# Patient Record
Sex: MALE | Race: WHITE | NOT HISPANIC OR LATINO | Employment: OTHER | URBAN - METROPOLITAN AREA
[De-identification: names, ages, dates, MRNs, and addresses within clinical notes are randomized per-mention and may not be internally consistent; named-entity substitution may affect disease eponyms.]

---

## 2023-04-16 ENCOUNTER — HOSPITAL ENCOUNTER (EMERGENCY)
Facility: HOSPITAL | Age: 33
Discharge: HOME | End: 2023-04-16
Attending: EMERGENCY MEDICINE
Payer: COMMERCIAL

## 2023-04-16 VITALS
HEART RATE: 65 BPM | OXYGEN SATURATION: 100 % | HEIGHT: 71 IN | BODY MASS INDEX: 23.71 KG/M2 | RESPIRATION RATE: 18 BRPM | DIASTOLIC BLOOD PRESSURE: 92 MMHG | TEMPERATURE: 98 F | SYSTOLIC BLOOD PRESSURE: 150 MMHG

## 2023-04-16 DIAGNOSIS — T14.8XXA FRACTURE: ICD-10-CM

## 2023-04-16 DIAGNOSIS — M25.532 ACUTE PAIN OF LEFT WRIST: ICD-10-CM
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
## 2023-04-16 DIAGNOSIS — M79.671 ACUTE FOOT PAIN, RIGHT: ICD-10-CM

## 2023-04-16 DIAGNOSIS — S52.502A CLOSED FRACTURE OF DISTAL END OF LEFT RADIUS, UNSPECIFIED FRACTURE MORPHOLOGY, INITIAL ENCOUNTER: Primary | ICD-10-CM

## 2023-04-16 DIAGNOSIS — Z01.811 PREOP RESPIRATORY EXAM: ICD-10-CM

## 2023-04-16 PROCEDURE — 96376 TX/PRO/DX INJ SAME DRUG ADON: CPT

## 2023-04-16 PROCEDURE — 99153 MOD SED SAME PHYS/QHP EA: CPT

## 2023-04-16 PROCEDURE — 25605 CLTX DST RDL FX/EPHYS SEP W/: CPT | Mod: LT

## 2023-04-16 PROCEDURE — 96374 THER/PROPH/DIAG INJ IV PUSH: CPT | Mod: 59

## 2023-04-16 PROCEDURE — 63600175 PHARM REV CODE 636 W HCPCS: Performed by: EMERGENCY MEDICINE

## 2023-04-16 PROCEDURE — 99285 EMERGENCY DEPT VISIT HI MDM: CPT | Mod: 25

## 2023-04-16 PROCEDURE — 96375 TX/PRO/DX INJ NEW DRUG ADDON: CPT | Mod: 59

## 2023-04-16 PROCEDURE — 99152 MOD SED SAME PHYS/QHP 5/>YRS: CPT

## 2023-04-16 RX ORDER — OXYCODONE AND ACETAMINOPHEN 7.5; 325 MG/1; MG/1
1 TABLET ORAL EVERY 4 HOURS PRN
Qty: 20 TABLET | Refills: 0 | Status: SHIPPED | OUTPATIENT
Start: 2023-04-16 | End: 2023-04-21

## 2023-04-16 RX ORDER — PROPOFOL 10 MG/ML
INJECTION, EMULSION INTRAVENOUS
Status: COMPLETED
Start: 2023-04-16 | End: 2023-04-16

## 2023-04-16 RX ORDER — PROPOFOL 10 MG/ML
VIAL (ML) INTRAVENOUS CODE/TRAUMA/SEDATION MEDICATION
Status: COMPLETED | OUTPATIENT
Start: 2023-04-16 | End: 2023-04-16

## 2023-04-16 RX ORDER — HYDROMORPHONE HYDROCHLORIDE 2 MG/ML
1 INJECTION, SOLUTION INTRAMUSCULAR; INTRAVENOUS; SUBCUTANEOUS
Status: COMPLETED | OUTPATIENT
Start: 2023-04-16 | End: 2023-04-16

## 2023-04-16 RX ORDER — ONDANSETRON 2 MG/ML
4 INJECTION INTRAMUSCULAR; INTRAVENOUS
Status: COMPLETED | OUTPATIENT
Start: 2023-04-16 | End: 2023-04-16

## 2023-04-16 RX ADMIN — PROPOFOL 20 MG: 10 INJECTION, EMULSION INTRAVENOUS at 12:04

## 2023-04-16 RX ADMIN — PROPOFOL 40 MG: 10 INJECTION, EMULSION INTRAVENOUS at 12:04

## 2023-04-16 RX ADMIN — ONDANSETRON 4 MG: 2 INJECTION INTRAMUSCULAR; INTRAVENOUS at 11:04

## 2023-04-16 RX ADMIN — PROPOFOL 30 MG: 10 INJECTION, EMULSION INTRAVENOUS at 12:04

## 2023-04-16 RX ADMIN — HYDROMORPHONE HYDROCHLORIDE 1 MG: 2 INJECTION, SOLUTION INTRAMUSCULAR; INTRAVENOUS; SUBCUTANEOUS at 11:04

## 2023-04-16 RX ADMIN — PROPOFOL 50 MG: 10 INJECTION, EMULSION INTRAVENOUS at 12:04

## 2023-04-16 RX ADMIN — HYDROMORPHONE HYDROCHLORIDE 1 MG: 2 INJECTION INTRAMUSCULAR; INTRAVENOUS; SUBCUTANEOUS at 12:04

## 2023-04-16 NOTE — ED PROVIDER NOTES
Encounter Date: 4/16/2023    SCRIBE #1 NOTE: I, Betzaida Liborio, am scribing for, and in the presence of,  Aakahs Edmondson MD. I have scribed the following portions of the note - Other sections scribed: HPI, ROS, PE.     History     Chief Complaint   Patient presents with    Fall     Pt fall approx 10ft off ladder; LT wrist deformity & RT foot pain. PMS intact.      A 32 year old male is presenting to the ED following a fall. The pt states that he fell off a ladder earlier today and is reporting left wrist pain and right foot pain. He denies any LOC, hitting his head, chest pain, shortness of breath, or neck pain.     The history is provided by the patient. No  was used.   Review of patient's allergies indicates:  No Known Allergies  No past medical history on file.  No past surgical history on file.  No family history on file.     Review of Systems   Constitutional:  Negative for chills and fever.   Respiratory:  Negative for cough, chest tightness and shortness of breath.    Cardiovascular:  Negative for chest pain.   Gastrointestinal:  Negative for abdominal pain, nausea and vomiting.   Musculoskeletal:  Positive for arthralgias (left wrist pain). Negative for myalgias and neck pain.        Right foot pain     Neurological:  Negative for syncope.   All other systems reviewed and are negative.    Physical Exam     Initial Vitals [04/16/23 1101]   BP Pulse Resp Temp SpO2   118/73 67 18 97.8 °F (36.6 °C) 96 %      MAP       --         Physical Exam    Nursing note and vitals reviewed.  Constitutional: He appears well-developed and well-nourished. No distress.   HENT:   Head: Normocephalic and atraumatic.   Eyes: Conjunctivae are normal.   Cardiovascular:  Normal rate.           Pulses:       Radial pulses are 2+ on the left side.   Pulmonary/Chest: No respiratory distress. He has no wheezes. He has no rhonchi.   Abdominal: Abdomen is soft. There is no abdominal tenderness. There is no rebound and  no guarding.   Musculoskeletal:         General: Normal range of motion.      Left wrist: Deformity present.      Right foot: Tenderness present.      Comments: Z deformity to the left wrist  Decreased extension of fingers to the left hand  Tenderness to the right medial aspect distal to the first MCP of the right foot   Full ROM of right foot  Capillary refill intact to right toes        Neurological: He is alert and oriented to person, place, and time. He has normal strength.   Normal light touch sensation of the left hand    Skin: Skin is warm and dry.   Psychiatric: He has a normal mood and affect.       ED Course   Orthopedic Injury    Date/Time: 4/16/2023 12:19 PM  Performed by: Aakash Edmondson MD  Authorized by: Aakash Edmondson MD     Location procedure was performed:  [facility] EMERGENCY DEPARTMENT  Consent Done?:  Yes  Universal Protocol:     Verbal consent obtained?: Yes      Written consent obtained?: Yes      Consent given by:  Patient    Patient states understanding of procedure being performed: Yes      Patient's understanding of procedure matches consent: Yes      Procedure consent matches procedure scheduled: Yes      Relevant documents present and verified: Yes      Test results available and properly labeled: Yes      Imaging studies available: Yes      Required items: Required blood products, implants, devices and special equipment avialable      Patient identity confirmed:  Name and verbally with patient  Injury:     Injury location:  Wrist    Location details:  Left wrist    Injury type:  Fracture-dislocation      Pre-procedure assessment:     Distal perfusion: normal      Neurological function: diminished      Range of motion: reduced      Local anesthesia used?: No      Patient sedated?: Yes      ASA Class:  Class 1 - Heathy patient. No medical history.    Mallampati Score:  Class 3 - Visualization of the soft palate and base of the uvula.    Date/Time of last fluid:  4/16/2023 9:00 AM     Sedation type: moderate (conscious) sedation      Sedation:  Propofol    Analgesia:  Hydromorphone    Sedation start:  4/16/2023 12:36 PM    Sedation end:  4/16/2023 1:00 PM    Vital signs: Vital signs monitored during sedation        Selections made in this section will also lock the Injury type section above.:     Manipulation performed?: Yes      Reduction successful?: Yes      Confirmation: Reduction confirmed by x-ray      Immobilization:  Splint    Splint type:  Sugar tong    Supplies used:  Ortho-Glass    Complications: No    Post-procedure assessment:     Distal perfusion: normal      Neurological function: normal      Range of motion: improved      Patient tolerance:  Patient tolerated the procedure well with no immediate complications  Labs Reviewed - No data to display       Imaging Results              X-Ray Wrist Complete Left (Final result)  Result time 04/16/23 13:08:40      Final result by Yessenia Eric MD (04/16/23 13:08:40)                   Impression:      Interval reduction of the comminuted angulated fracture of the distal radius when compared to the prior examination    Otherwise unchanged      Electronically signed by: Yessenia Eric  Date:    04/16/2023  Time:    13:08               Narrative:    EXAMINATION:  XR WRIST COMPLETE 3 VIEWS LEFT    CLINICAL HISTORY:  Other injury of unspecified body region, initial encounter    TECHNIQUE:  PA, lateral, and oblique views of the left wrist were performed.    COMPARISON:  04/16/2023    FINDINGS:  There is comminuted fracture of the left distal radius.  The fracture has been reduced.  Good anatomic alignment is noted.  There is also fracture of the ulnar styloid.                                       X-Ray Wrist Complete Left (Final result)  Result time 04/16/23 11:59:26      Final result by Jh Lees MD (04/16/23 11:59:26)                   Impression:      Fractures.      Electronically signed by: Jh  Yoana  Date:    04/16/2023  Time:    11:59               Narrative:    EXAMINATION:  XR WRIST COMPLETE 3 VIEWS LEFT    CLINICAL HISTORY:  Acute pain of the left wrist.    TECHNIQUE:  Three views.    COMPARISON:  None available.    FINDINGS:  There is severely comminuted impacted fracture involving the distal radius with displacement and angulation.  Fracture lines extend and disrupt the articular surface.  There is also fracture displacement of the ulnar styloid process.    On the lateral radiograph, there is also fractured triquetrum.                                       X-Ray Chest AP Portable (Final result)  Result time 04/16/23 11:57:35      Final result by Jh Lees MD (04/16/23 11:57:35)                   Impression:      NO ACUTE CARDIOPULMONARY PROCESS IDENTIFIED.      Electronically signed by: Jh Lees  Date:    04/16/2023  Time:    11:57               Narrative:    EXAMINATION:  XR CHEST AP PORTABLE    CLINICAL HISTORY:  Encounter for preprocedural respiratory examination    TECHNIQUE:  One view    COMPARISON:  None available.    FINDINGS:  Cardiopericardial silhouette is within normal limits. Lungs are without dense focal or segmental consolidation, congestive process, pleural effusions or pneumothorax.                                       Medications   propofoL (DIPRIVAN) 10 mg/mL infusion (40 mg  Incomplete 4/16/23 1243)   HYDROmorphone (PF) injection 1 mg (1 mg Intravenous Given 4/16/23 1125)   ondansetron injection 4 mg (4 mg Intravenous Given 4/16/23 1125)   HYDROmorphone (PF) injection 1 mg (1 mg Intravenous Given 4/16/23 1215)   propofol (DIPRIVAN) 10 mg/mL IVP (20 mg Intravenous Given 4/16/23 1248)     Medical Decision Making:   Clinical Tests:   Lab Tests: Ordered and Reviewed  Radiological Study: Ordered and Reviewed    Medical Decision Making  Differential diagnosis include but are not limited to:  Fall, wrist fracture, foot fracture, radial nerve injury     Problems  Addressed:  Closed fracture of distal end of left radius, unspecified fracture morphology, initial encounter: acute illness or injury that poses a threat to life or bodily functions    Amount and/or Complexity of Data Reviewed  Labs: ordered.  Radiology: ordered.    Risk  Prescription drug management.  Parenteral controlled substances.  Drug therapy requiring intensive monitoring for toxicity.  Decision regarding hospitalization.  Risk Details: Conscious sedation and fracture reduction performed in the emergency department as documented above             Scribe Attestation:   Scribe #1: I performed the above scribed service and the documentation accurately describes the services I performed. I attest to the accuracy of the note.    Attending Attestation:           Physician Attestation for Scribe:  Physician Attestation Statement for Scribe #1: I, Aakash Edmondson MD, reviewed documentation, as scribed by Betzaida Capone in my presence, and it is both accurate and complete.           ED Course as of 04/16/23 1311   Sun Apr 16, 2023   1113 Will set up for conscious sedation using propofol.  I discussed risks and benefits of the medication discussed procedure of reduction of the wrist.  Patient agreed.  Will give pain medication.  I have asked the RN placement finger traps while sitting up [LF]   1114 Denies any has had no head neck chest pelvis pain.  Abdomen is soft nontender lungs clear to auscultation.  Cranial nerves are intact.  He is decreased extension of the left fingers but he is normal light touch sensation radial ulnar and median distribution 2+ radial pulse and brisk capillary refill.  In the right foot he has some tenderness in a very mild contusion on the medial aspect of the distal metacarpal just proximal to the MCP joint of the 1st toe.  Again brisk capillary refill normal motor normal sensation [LF]   1147 I discussed getting x-ray of the foot to evaluate fracture.  X-ray was ordered but apparently the  patient refused it when x-ray when to perform it [LF]   1154 My interpretation of the x-ray of the left wrist there is a comminuted dorsally displaced distal radius fracture.  Tip of the distal ulnar styloid fracture as well [LF]   1306 Patient placed in a sugar-tong splint by me with the assistance of Georgina the ED technician.  Repeat x-ray shows improved alignment.  Now able to extend his fingers.  Still strong 2+ radial pulse and brisk capillary refill.  Light touch sensation is intact in the hand.  Discussed pain medication and following up with Orthopedic surgery [LF]      ED Course User Index  [LF] Aakash Edmondson MD                   Clinical Impression:   Final diagnoses:  [M25.532] Acute pain of left wrist  [M79.671] Acute foot pain, right  [Z01.811] Preop respiratory exam  [S52.502A] Closed fracture of distal end of left radius, unspecified fracture morphology, initial encounter (Primary)  [W19.XXXA] Fall, initial encounter  [T14.8XXA] Fracture        ED Disposition Condition    Discharge Stable          ED Prescriptions       Medication Sig Dispense Start Date End Date Auth. Provider    oxyCODONE-acetaminophen (PERCOCET) 7.5-325 mg per tablet Take 1 tablet by mouth every 4 (four) hours as needed for Pain. 20 tablet 4/16/2023 4/21/2023 Aakash Edmondson MD          Follow-up Information       Follow up With Specialties Details Why Contact Info    Shabbir Riggins MD Orthopedic Surgery Schedule an appointment as soon as possible for a visit   [address] [facility] [address]  [place] [facility] [address]  [phone]      Primary care provider   You can call [phone] to get set up with a local primary care provider within the next few days.If your symptoms worsen or change please return to the emergency department for re-evaluation Call your primary care provider to schedule a follow-up appointment within a week    Rajiv Kendrick DO Family Medicine Schedule an appointment as soon as possible for a visit   [address]  [facility]  [address]  [facility]  [place] [address]  [phone]               Aakash Edmondson MD  04/16/23 0731

## 2023-04-16 NOTE — Clinical Note
"Fransisco Ahn"Ambrocio was seen and treated in our emergency department on 4/16/2023.  He may return to work on 04/19/2023.  No weight-bearing with the left upper extremity until cleared by Orthopedic surgery     If you have any questions or concerns, please don't hesitate to call.      Aakash Edmondson MD"